# Patient Record
Sex: MALE | Race: WHITE
[De-identification: names, ages, dates, MRNs, and addresses within clinical notes are randomized per-mention and may not be internally consistent; named-entity substitution may affect disease eponyms.]

---

## 2017-05-02 ENCOUNTER — HOSPITAL ENCOUNTER (OUTPATIENT)
Dept: HOSPITAL 45 - C.ULTR | Age: 62
Discharge: HOME | End: 2017-05-02
Attending: PHYSICIAN ASSISTANT
Payer: COMMERCIAL

## 2017-05-02 DIAGNOSIS — E01.0: Primary | ICD-10-CM

## 2017-05-02 NOTE — DIAGNOSTIC IMAGING REPORT
THYROID ULTRASONOGRAPHY



CLINICAL HISTORY: Thyromegaly    



COMPARISON STUDY:  No previous studies for comparison.



FINDINGS: The right lobe measures 5.8 x 2.2 x 1.8 cm. The left lobe measures 4.7

x 1.7 x 1.9 cm. Both lobes are slightly heterogeneous in echotexture. There are

no focal masses.



IMPRESSION:  Subtly heterogeneous thyroid echotexture. No focal masses

identified. 









Electronically signed by:  Ronald Vazquez M.D.

5/2/2017 7:53 AM



Dictated Date/Time:  5/2/2017 7:53 AM

## 2017-10-18 ENCOUNTER — HOSPITAL ENCOUNTER (OUTPATIENT)
Dept: HOSPITAL 45 - C.LABBFT | Age: 62
Discharge: HOME | End: 2017-10-18
Attending: INTERNAL MEDICINE
Payer: COMMERCIAL

## 2017-10-18 DIAGNOSIS — C61: ICD-10-CM

## 2017-10-18 DIAGNOSIS — R73.01: Primary | ICD-10-CM

## 2017-10-18 LAB
ALBUMIN/GLOB SERPL: 1.1 {RATIO} (ref 0.9–2)
ALP SERPL-CCNC: 79 U/L (ref 45–117)
ALT SERPL-CCNC: 36 U/L (ref 12–78)
ANION GAP SERPL CALC-SCNC: 8 MMOL/L (ref 3–11)
AST SERPL-CCNC: 24 U/L (ref 15–37)
BASOPHILS # BLD: 0.05 K/UL (ref 0–0.2)
BASOPHILS NFR BLD: 0.8 %
BUN SERPL-MCNC: 20 MG/DL (ref 7–18)
BUN/CREAT SERPL: 16.7 (ref 10–20)
CALCIUM SERPL-MCNC: 8.5 MG/DL (ref 8.5–10.1)
CHLORIDE SERPL-SCNC: 107 MMOL/L (ref 98–107)
CHOLEST/HDLC SERPL: 3.4 {RATIO}
CO2 SERPL-SCNC: 24 MMOL/L (ref 21–32)
COMPLETE: YES
CREAT SERPL-MCNC: 1.19 MG/DL (ref 0.6–1.4)
EOSINOPHIL NFR BLD AUTO: 240 K/UL (ref 130–400)
EST. AVERAGE GLUCOSE BLD GHB EST-MCNC: 117 MG/DL
GLOBULIN SER-MCNC: 3.4 GM/DL (ref 2.5–4)
GLUCOSE SERPL-MCNC: 118 MG/DL (ref 70–99)
GLUCOSE UR QL: 52 MG/DL
HCT VFR BLD CALC: 42.9 % (ref 42–52)
IG%: 0.3 %
IMM GRANULOCYTES NFR BLD AUTO: 39.3 %
KETONES UR QL STRIP: 102 MG/DL
LYMPHOCYTES # BLD: 2.39 K/UL (ref 1.2–3.4)
MCH RBC QN AUTO: 29.2 PG (ref 25–34)
MCHC RBC AUTO-ENTMCNC: 33.3 G/DL (ref 32–36)
MCV RBC AUTO: 87.6 FL (ref 80–100)
MONOCYTES NFR BLD: 9.4 %
NEUTROPHILS # BLD AUTO: 6.7 %
NEUTROPHILS NFR BLD AUTO: 43.5 %
NITRITE UR QL STRIP: 114 MG/DL (ref 0–150)
PH UR: 177 MG/DL (ref 0–200)
PMV BLD AUTO: 9.7 FL (ref 7.4–10.4)
POTASSIUM SERPL-SCNC: 4.6 MMOL/L (ref 3.5–5.1)
PSA FREE SERPL-MCNC: < 0.02 NG/ML
PSA SERPL-MCNC: 0.21 NG/ML (ref 0–4)
RBC # BLD AUTO: 4.9 M/UL (ref 4.7–6.1)
SODIUM SERPL-SCNC: 139 MMOL/L (ref 136–145)
VERY LOW DENSITY LIPOPROT CALC: 23 MG/DL
WBC # BLD AUTO: 6.08 K/UL (ref 4.8–10.8)

## 2017-11-14 ENCOUNTER — HOSPITAL ENCOUNTER (OUTPATIENT)
Dept: HOSPITAL 45 - C.LABBFT | Age: 62
Discharge: HOME | End: 2017-11-14
Attending: PHYSICIAN ASSISTANT
Payer: COMMERCIAL

## 2017-11-14 DIAGNOSIS — C61: ICD-10-CM

## 2017-11-14 DIAGNOSIS — Z01.812: Primary | ICD-10-CM

## 2017-11-14 LAB
BUN SERPL-MCNC: 21 MG/DL (ref 7–18)
CREAT SERPL-MCNC: 1.09 MG/DL (ref 0.6–1.4)

## 2017-11-17 ENCOUNTER — HOSPITAL ENCOUNTER (OUTPATIENT)
Dept: HOSPITAL 45 - C.NUCL | Age: 62
Discharge: HOME | End: 2017-11-17
Attending: RADIOLOGY
Payer: COMMERCIAL

## 2017-11-17 DIAGNOSIS — C61: Primary | ICD-10-CM

## 2017-11-17 NOTE — DIAGNOSTIC IMAGING REPORT
BONE SCAN WHOLE BODY



HISTORY: Prostate carcinoma  PROSTATE CA



RADIOTRACER:  25.8 mCi Tc-99m MDP



STUDY/IMAGES:  Planar anterior and posterior whole body imaging was performed 3

hours following the intravenous administration of radiotracer.     



COMPARISON:  6/10/2008



FINDINGS: Bilateral renal activity is present. Mild degenerative activity of the

shoulders and elbows similar to the prior exam.



Increased activity right sternoclavicular joint most likely degenerative or

posttraumatic.



No abnormal soft tissue activity characteristics. No evidence for a metastatic

bone pattern.



IMPRESSION:  



1. No evidence for metastatic bone disease.





2. Degenerative activity of the shoulders and right sternoclavicular joint 











The above report was generated using voice recognition software.  It may contain

grammatical, syntax or spelling errors.







Electronically signed by:  Mukul Ramos M.D.

11/17/2017 11:38 AM



Dictated Date/Time:  11/17/2017 11:36 AM

## 2017-11-22 ENCOUNTER — HOSPITAL ENCOUNTER (OUTPATIENT)
Age: 62
Discharge: HOME | End: 2017-11-22
Attending: RADIOLOGY
Payer: COMMERCIAL

## 2017-11-22 DIAGNOSIS — Z90.79: ICD-10-CM

## 2017-11-22 DIAGNOSIS — C61: Primary | ICD-10-CM

## 2017-11-22 NOTE — DIAGNOSTIC IMAGING REPORT
PROSTATE MRI COMBO



CLINICAL HISTORY: 62 years-old Male presenting with rising PSA status post

prostatectomy in 2008. PSA 0.2 ng/mL. 



TECHNIQUE: Multisequence, multiplanar MR imaging of the prostate was performed

before and after the administration of intravenous contrast. Additional

postprocessing was performed on a separate [employer] workstation by the

radiologist. Imaging was performed before and after the administration of 8.5 cc

of intravenous Gadavist.



COMPARISON: None.



FINDINGS:



The prostate is surgically absent.



There is no evidence of pathologic adenopathy.



There are no suspicious areas of marrow replacement.



Incidental note is made of sigmoid diverticulosis.



Dynamic postcontrast images reveal no areas of pathologic enhancement with

specific attention to the vesicoureteral anastomosis.



IMPRESSION:



Postsurgical changes of a prior prostatectomy. No MRI evidence of tumor

recurrence







Electronically signed by:  Ronald Vazquez M.D.

11/22/2017 9:21 AM



Dictated Date/Time:  11/22/2017 9:10 AM

## 2018-03-29 ENCOUNTER — HOSPITAL ENCOUNTER (OUTPATIENT)
Dept: HOSPITAL 45 - C.ONC | Age: 63
Discharge: HOME | End: 2018-03-29
Attending: PHYSICIAN ASSISTANT
Payer: COMMERCIAL

## 2018-03-29 VITALS
HEART RATE: 82 BPM | OXYGEN SATURATION: 98 % | TEMPERATURE: 97.7 F | SYSTOLIC BLOOD PRESSURE: 162 MMHG | DIASTOLIC BLOOD PRESSURE: 91 MMHG

## 2018-03-29 DIAGNOSIS — Z92.3: ICD-10-CM

## 2018-03-29 DIAGNOSIS — Z85.46: ICD-10-CM

## 2018-03-29 DIAGNOSIS — Z08: Primary | ICD-10-CM

## 2018-03-29 NOTE — RADIATION ONCOLOGY FOLLOW-UP
Radiation Oncology Follow-Up


Date of Visit


Mar 29, 2018.





Reason For Visit


One month follow up and cancer survivorship care plan





Radiation Completion Date


FINISHED   2-





Diagnosis





(1) Prostate cancer


Status:  Acute        Onset Date:  6/20/2008


Location:  Left lobe of the prostate


Histology Subtype:  Adenocarcinoma


Stage:  lll


Permanent Comment:  Rising PSA, pretreatment PSA 4.2


Status post ultrasound-guided biopsies 06/20/2008


Adenocarcinoma Dominic 3+3


Status post robotic-assisted prostatectomy and lymph node dissection 07/14/2008 

(Dr. Fernandes -[facility])


[place] 3+3, extraprostatic extension, positive margins at left posterior 

lateral, mid, and base


Stage pT3a pN0M0


Rising PSA to 0.207 on 10/18/2017


Lupron 22.5 mg IM November 29, 2017 


Lupron 22.5 mg IM February 23, 2018, last injection


Status post completion of salvage radiation therapy February 23, 2018 he 

received 6840 cGy utilizing volumetric modulated arc therapy.  Last Edited By: 

Cassidy De Souza on Mar 29, 2018 15:31





History of Present Illness


Mr. Novoa has a history of prostate cancer.  The patient initially 

presented with an elevated PSA of 4.2.  The patient underwent transrectal 

ultrasound-guided biopsy of the prostate gland on 06/20/2008 at [facility]; pathology revealed prostate adenocarcinoma with the Dominic score 

being 3+3.  The patient elected to undergo a radical prostatectomy by Dr. Fernandes.  

The patient underwent a radical prostatectomy on 07/14/2008 which revealed 

prostate adenocarcinoma that was [place] 3+3 primarily involving the left 

prostate gland; there were multiple positive margins involving the mid and base 

of the gland as well as the bladder neck (as per pathology report).  There was 

also evidence of extra prostatic extension but no evidence of seminal vesicle 

invasion or regional lymph nodes.  The patient was stages pT3a with positive 

margins.  The patient was placed on observation and initially had an 

undetectable PSA.  The patient's PSA began to rise in January 2014 to 0.13.  

More recently, his PSA was 0.191 on 10/04/2016 and 0.207 on 10/18/2017.  We are 

now seeing the patient in consultation to discuss the role of radiation therapy.





In general, the patient is doing relatively well overall.  He states he does 

not have any urinary incontinence.  His EPIC QoL score is 9/60.  His urinary 

IPSS score is 1/35.  He denies any history of rectal hemorrhoids.  He has no 

other complaints.








He returned to our office.  He was given hormone suppression with Lupron.  He 

underwent salvage radiation therapy.  This was completed February 23, 2018.  He 

received 6840 cGy.





Interim History


He has been doing well over this past month.  Today he gave an AUA score of 2.  

He completed and expanded prostate cancer index composite for clinical practice 

and gave a score of 3 of 12 and urinary incontinence symptoms.  He gave a score 

of 1 of 12 and urinary irritation symptoms.  He gave a score of 0 12 and bowel 

symptoms.  He gave a score of 12 and sexual symptoms.  He gave a score of 0 12 

and hormonal vitality symptoms.  His total was 13 of 60.





Allergies


Coded Allergies:  


     Adhesives (Verified  Allergy, Unknown, SLIGHT BREAKOUTS/REDNESS, 12/14/15)


     Latex (Verified  Allergy, Unknown, Cracked skin on fingers when wears 

gloves, 11/9/17)





Home Medications


Scheduled


Losartan Potassium (Cozaar), 50 MG PO QAM


Simvastatin (Zocor), 40 MG PO QPM





Scheduled PRN


Sildenafil Citrate (Viagra), 1 TAB PO DAILY PRN for UNDECIDED





Review of Systems


Gastrointestinal:  


   Symptoms:  WNL


Oral:  


   Symptoms:  No Problems


Respiratory:  


   Other Respiratory:  " I have a cold  "


Urinary:  


   Comments:  occ dribbling


Skin:  


   Symptoms:  No Problems


Additional Notes:


He completed a distress management report and answered "no" to all questions.





Physical Exam





Vital Signs








  Date Time  Temp Pulse Resp B/P (MAP) Pulse Ox O2 Delivery O2 Flow Rate FiO2


 


3/29/18 14:41 36.5 82 16 162/91 98   








ECOG Performance Status:  0


General Appearance:  no apparent distress


Eyes:  normal inspection, EOMI


ENT:  normal ENT inspection, hearing grossly normal


Respiratory/Chest:  lungs clear, no respiratory distress, no accessory muscle 

use


Cardiovascular:  regular rate, rhythm, no gallop, no murmur


Abdomen:  non tender, soft, no organomegaly


Extremities:  no pedal edema


Neurologic/Psychiatric:  no motor/sensory deficits, alert, normal mood/affect


Skin:  warm/dry





Pain Management


Patient Reports Pain:  No


Side:  Bilateral


Patient Preferred Pain Scale:  0 - 10


Initial Pain Intensity:  0.0


Pain Management Plan


He denied pain therefore requires no pain management.





Laboratory


Laboratory Results:  pending





Pathology


Pathology Results:  were reviewed, and pertinent findings noted in HPI





Imaging


Imaging Studies:  were reviewed, and pertinent findings noted in HPI





Assessment & Plan


Plan: Patient is also seen and examined by Dr. Rojas today.  A PSA was drawn.  

He is going to call tomorrow for the test results.  He has a resolving upper 

respiratory infection.  We discussed avoidance of over-the-counter medications 

that could affect his urination.  Will plan to have him return in 3 months for 

recheck visit and PSA.  Today we completed a cancer survivorship care plan.  A 

copy of the document was given to the patient.  He was given a survivorship 

booklet.  He may call our office if he has any questions or concerns in the 

interim.





Assessment & Plan (Attending)


I agree with note created by Cassidy De Souza PA-C. I reviewed the patient's 

chart and information with her.  I have examined and evaluated the patient. I 

reviewed relevant clinical information and answered the patient's and/or family'

s questions. 





Total Time


In Follow-Up


I spent 20 minutes speaking to the patient in performing examination.  I spent 

20 minutes reviewing information, preparing the survivorship document, and 

completing this note.  AK





Total Time (Attending)


In Follow-Up


I spent 15 minutes examining and counseling the patient. 





Copy To


Nayan Sebastian M.D.